# Patient Record
Sex: MALE | Race: WHITE | Employment: OTHER | URBAN - NONMETROPOLITAN AREA
[De-identification: names, ages, dates, MRNs, and addresses within clinical notes are randomized per-mention and may not be internally consistent; named-entity substitution may affect disease eponyms.]

---

## 2023-07-26 ENCOUNTER — OFFICE VISIT (OUTPATIENT)
Dept: ONCOLOGY | Age: 83
End: 2023-07-26
Payer: MEDICARE

## 2023-07-26 ENCOUNTER — HOSPITAL ENCOUNTER (OUTPATIENT)
Dept: INFUSION THERAPY | Age: 83
Discharge: HOME | End: 2023-07-26
Payer: MEDICARE

## 2023-07-26 VITALS
BODY MASS INDEX: 26.25 KG/M2 | TEMPERATURE: 97.9 F | DIASTOLIC BLOOD PRESSURE: 67 MMHG | RESPIRATION RATE: 20 BRPM | SYSTOLIC BLOOD PRESSURE: 131 MMHG | WEIGHT: 173.2 LBS | OXYGEN SATURATION: 96 % | HEIGHT: 68 IN | HEART RATE: 96 BPM

## 2023-07-26 VITALS
BODY MASS INDEX: 26.25 KG/M2 | WEIGHT: 173.2 LBS | HEIGHT: 68 IN | SYSTOLIC BLOOD PRESSURE: 131 MMHG | OXYGEN SATURATION: 96 % | TEMPERATURE: 97.9 F | HEART RATE: 96 BPM | RESPIRATION RATE: 20 BRPM | DIASTOLIC BLOOD PRESSURE: 67 MMHG

## 2023-07-26 DIAGNOSIS — D47.2 MONOCLONAL GAMMOPATHY OF UNDETERMINED SIGNIFICANCE: ICD-10-CM

## 2023-07-26 DIAGNOSIS — D47.2 MONOCLONAL GAMMOPATHY OF UNDETERMINED SIGNIFICANCE: Primary | ICD-10-CM

## 2023-07-26 PROCEDURE — G8427 DOCREV CUR MEDS BY ELIG CLIN: HCPCS | Performed by: INTERNAL MEDICINE

## 2023-07-26 PROCEDURE — 99204 OFFICE O/P NEW MOD 45 MIN: CPT | Performed by: INTERNAL MEDICINE

## 2023-07-26 PROCEDURE — 84156 ASSAY OF PROTEIN URINE: CPT

## 2023-07-26 PROCEDURE — 1036F TOBACCO NON-USER: CPT | Performed by: INTERNAL MEDICINE

## 2023-07-26 PROCEDURE — 1123F ACP DISCUSS/DSCN MKR DOCD: CPT | Performed by: INTERNAL MEDICINE

## 2023-07-26 PROCEDURE — 86335 IMMUNFIX E-PHORSIS/URINE/CSF: CPT

## 2023-07-26 PROCEDURE — 99211 OFF/OP EST MAY X REQ PHY/QHP: CPT

## 2023-07-26 PROCEDURE — G8419 CALC BMI OUT NRM PARAM NOF/U: HCPCS | Performed by: INTERNAL MEDICINE

## 2023-07-26 PROCEDURE — 82784 ASSAY IGA/IGD/IGG/IGM EACH: CPT

## 2023-07-26 RX ORDER — CLOPIDOGREL BISULFATE 75 MG/1
75 TABLET ORAL DAILY
COMMUNITY

## 2023-07-26 RX ORDER — GLIPIZIDE 10 MG/1
10 TABLET ORAL DAILY
COMMUNITY

## 2023-07-26 RX ORDER — MECLIZINE HYDROCHLORIDE 25 MG/1
25 TABLET ORAL 2 TIMES DAILY PRN
COMMUNITY

## 2023-07-26 RX ORDER — IBUPROFEN 200 MG
200 TABLET ORAL EVERY 6 HOURS PRN
COMMUNITY

## 2023-07-26 RX ORDER — TETRACYCLINE HCL 500 MG
CAPSULE ORAL
COMMUNITY

## 2023-07-26 RX ORDER — ATORVASTATIN CALCIUM 40 MG/1
40 TABLET, FILM COATED ORAL DAILY
COMMUNITY

## 2023-07-26 RX ORDER — CHOLECALCIFEROL (VITAMIN D3) 125 MCG
500 CAPSULE ORAL DAILY
COMMUNITY

## 2023-07-26 NOTE — PROGRESS NOTES
Estimated body surface area is 1.94 meters squared as calculated from the following:    Height as of this encounter: 5' 8\" (1.727 m). Weight as of this encounter: 173 lb 3.2 oz (78.6 kg). ECO      DATA:    LAB:   CBC Auto Differential  Order: [id number]   Ref Range & Units 23 1851   WBC 4.50 - 11.00 K/mcL 8.98    RBC 4.50 - 5.90 M/mcL 4.30 Low     Hemoglobin 13.5 - 17.5 g/dL 13.5    Hematocrit 41.0 - 53.0 % 41.0    MCV 80.0 - 100.0 fL 95.3    MCH 26.0 - 34.0 pg 31.4    MCHC 31.0 - 37.0 g/dL 32.9    Platelets 212 - 519 K/mcL 224    RDW - CV 11.6 - 14.8 % 13.4    MPV 9.4 - 12.4 fL 10.8    Neutrophils % 65.1    Lymphocytes % 25.5    Monocytes % 7.0    Eosinophils % 1.4    Basophils % 0.7    IG Percent % 0.30    Comment: The IG parameter is the percentage of metamyelocytes, myelocytes and promyelocytes. An immature granulocyte count (IG) of 1% or more suggests the possibility of infection, an IG count of 3% is very likely related to an infection. Neutrophils Abs 1.70 - 7.00 K/mcL 5.84    Lymphocytes Abs 0.90 - 4.00 K/mcL 2.29    Monocytes Abs 0.30 - 0.90 K/mcL 0.63    Eosinophils Abs 0.00 - 0.50 K/mcL 0.13    Basophils Abs 0.00 - 0.30 K/mcL 0.06    IG Absolute 0.00 - 0.30 K/mcL 0.03    Nucleated RBC % 0.0    Nucleated RBC Abs 0.00 - 0.00 K/mcL 0.00    Resulting Agency  [facility]   Specimen Collected: 23 18:51 Last Resulted: 23 18:58   Received From: [facility]  Result Received: 23 13:20     Chem 7  Order: [id number]   Ref Range & Units 23 1851   Sodium 135 - 145 mmol/L 137    Potassium 3.5 - 5.1 mmol/L 4.3    Chloride 98 - 108 mmol/L 107    Bicarbonate 21 - 32 mmol/L 23    Anion Gap 10 - 20 mmol/L 11    Glucose 65 - 99 mg/dL 252 High     BUN 8 - 25 mg/dL 14    Creatinine 0.80 - 1.30 mg/dL 1.07    eGFR >=60 mL/min/1.73 m2 69    Comment: Estimated GFR was calculated using the  CKD-EPI creatinine equation.    BUN/Creatinine Ratio 10.0 - 20.0 13.1    Resulting Agency  [facility]

## 2023-07-27 LAB
IGA SERPL-MCNC: 311 MG/DL (ref 70–400)
IGG SERPL-MCNC: 1022 MG/DL (ref 700–1600)

## 2023-07-27 ASSESSMENT — ENCOUNTER SYMPTOMS
TROUBLE SWALLOWING: 0
STRIDOR: 0
BACK PAIN: 0
VOMITING: 0
BLOOD IN STOOL: 0
CHEST TIGHTNESS: 0
FACIAL SWELLING: 0
APNEA: 0
COUGH: 0
SHORTNESS OF BREATH: 0
ANAL BLEEDING: 0
RECTAL PAIN: 0
COLOR CHANGE: 0
EYE PAIN: 0
WHEEZING: 0
CONSTIPATION: 0
ABDOMINAL PAIN: 0
CHOKING: 0
SINUS PAIN: 0
DIARRHEA: 0
NAUSEA: 0
ABDOMINAL DISTENTION: 0
EYE DISCHARGE: 0

## 2023-07-27 NOTE — PATIENT INSTRUCTIONS
ASSESSMENT/PLAN:    1: Diagnosis: Monoclonal gammopathy of undetermined significance (MGUS)      2) Treatment goal:      Treatment plan:       LAB TODAY: IGG;IGA;IGM;URINE IMMUNOFIXATION       SKELETAL X-RAY SURVEY TODAY       OFFICE APPOINTMENT 3 MONTHS WITH CBC;BMP;KAPPA/LAMBDA IMMUNOGLOBULIN FREE LIGHT CHAINS;SPEP;IGG;IGA;IGM      3) Follow Up:3 MONTHS WITH CBC;BMP;KAPPA/LAMBDA IMMUNOGLOBULIN FREE LIGHT CHAINS;SPEP;IGG;IGA;IGM

## 2023-07-28 LAB — IGM SERPL-MCNC: 83 MG/DL (ref 40–230)

## 2023-07-30 LAB — INTERPRETATION 24H UR IFE-IMP: NORMAL

## 2023-08-02 ENCOUNTER — TELEPHONE (OUTPATIENT)
Dept: ONCOLOGY | Age: 83
End: 2023-08-02

## 2023-08-02 NOTE — TELEPHONE ENCOUNTER
TRIED TO CALL PATIENT WITH HIS FOLLOW UP APPT DATE/TIME, I WAS UNABLE TO REACH HIM. WASN'T ABLE TO LEAVE A MESSAGE. I TRIED CALLING ALTERNATE CONTACT WAS UNABLE TO CONTACT THEM. I SENT OUT A LETTER WITH HIS APPT DATE AND TIME.